# Patient Record
Sex: FEMALE | Race: WHITE | NOT HISPANIC OR LATINO | URBAN - METROPOLITAN AREA
[De-identification: names, ages, dates, MRNs, and addresses within clinical notes are randomized per-mention and may not be internally consistent; named-entity substitution may affect disease eponyms.]

---

## 2019-10-18 ENCOUNTER — OUTPATIENT (OUTPATIENT)
Dept: OUTPATIENT SERVICES | Facility: HOSPITAL | Age: 29
LOS: 1 days | Discharge: HOME | End: 2019-10-18

## 2019-10-23 LAB — SURGICAL PATHOLOGY STUDY: SIGNIFICANT CHANGE UP

## 2024-06-04 PROBLEM — Z00.00 ENCOUNTER FOR PREVENTIVE HEALTH EXAMINATION: Status: ACTIVE | Noted: 2024-06-04

## 2024-08-07 ENCOUNTER — APPOINTMENT (OUTPATIENT)
Dept: GASTROENTEROLOGY | Facility: CLINIC | Age: 34
End: 2024-08-07

## 2024-08-07 PROBLEM — K59.00 CONSTIPATION: Status: ACTIVE | Noted: 2024-08-07

## 2024-08-07 PROBLEM — K62.5 RECTAL BLEEDING: Status: ACTIVE | Noted: 2024-08-07

## 2024-08-07 PROBLEM — D89.40 MAST CELL ACTIVATION SYNDROME: Status: RESOLVED | Noted: 2024-08-07 | Resolved: 2024-08-07

## 2024-08-07 PROBLEM — Q79.62 EHLERS-DANLOS, HYPERMOBILE TYPE: Status: RESOLVED | Noted: 2024-08-07 | Resolved: 2024-08-07

## 2024-08-07 PROBLEM — Z78.9 NEVER SMOKED CIGARETTES: Status: ACTIVE | Noted: 2024-08-07

## 2024-08-07 PROBLEM — R13.10 DYSPHAGIA: Status: ACTIVE | Noted: 2024-08-07

## 2024-08-07 PROBLEM — Z78.9 RARELY CONSUMES ALCOHOL: Status: ACTIVE | Noted: 2024-08-07

## 2024-08-07 PROBLEM — R11.0 NAUSEA: Status: ACTIVE | Noted: 2024-08-07

## 2024-08-07 PROBLEM — Z83.49 FAMILY HISTORY OF THYROID NODULE: Status: ACTIVE | Noted: 2024-08-07

## 2024-08-07 PROBLEM — R93.5 ABNORMAL CT OF THE ABDOMEN: Status: ACTIVE | Noted: 2024-08-07

## 2024-08-07 PROBLEM — G90.A POTS (POSTURAL ORTHOSTATIC TACHYCARDIA SYNDROME): Status: RESOLVED | Noted: 2024-08-07 | Resolved: 2024-08-07

## 2024-08-07 PROBLEM — R12 HEARTBURN: Status: ACTIVE | Noted: 2024-08-07

## 2024-08-07 PROBLEM — Z80.0 FAMILY HISTORY OF MALIGNANT NEOPLASM OF ESOPHAGUS: Status: ACTIVE | Noted: 2024-08-07

## 2024-08-07 PROCEDURE — 36415 COLL VENOUS BLD VENIPUNCTURE: CPT

## 2024-08-07 PROCEDURE — 99205 OFFICE O/P NEW HI 60 MIN: CPT

## 2024-08-07 NOTE — REASON FOR VISIT
[Initial Evaluation] : an initial evaluation [FreeTextEntry1] : Constipation, rectal bleeding, heartburn, dysphagia, nausea, abnormal CT scan

## 2024-08-07 NOTE — HISTORY OF PRESENT ILLNESS
[FreeTextEntry1] : The patient is a 34-year-old woman with hypermobile Josselyn-Danlos syndrome and POTS syndrome who has longstanding GI issues dating back to high school.  At age 16, she suffered from constipation, rectal bleeding, nausea.  She would go over 1 week without a bowel movement.  The symptoms worsened in college.  She started on a gluten-free and dairy free diet which has helped her although she continues to have symptoms.  Currently, the patient gets heartburn for which she takes Tums approximately once a week.  She notes dysphagia to both solids and liquids, noting that these are slow to go down.  She denies regurgitation.  She gets nausea and has occasional vomiting.  She also has diffuse abdominal pain.  She has a longstanding history of constipation.  She has used multiple remedies in the past.  MiraLAX makes her vomit and does not work.  Linzess gave her pain.  The patient had also been on Motegrity.  She now uses both saline and Fleet enemas approximately 4 times a month and Dulcolax suppositories 1-2 times a month.  With laxatives, she has a bowel movement every 4 to 5 days.  She sees bright red blood with every bowel movement in the toilet water.  She has seen black stool in the past but not recently.  The patient's weight is stable.  She remains generally gluten-free although she cheats 1-2 times a month.  She is also dairy free.  She avoids spicy foods, garlic, onions, tomatoes.  We spent some time reviewing the evaluations that have been done in the past.  I reviewed a large medical record.  The patient underwent EGD and colonoscopy on September 1, 2020.  Apparently, this was done while the patient was still on a gluten-free diet.  EGD was significant for an irregular Z-line.  Biopsies from the small bowel, antrum, body, and GE junction showed scattered mast cells but no proliferations of mast cells on  staining.  A colonoscopy was significant only for internal hemorrhoids.  Biopsies taken from the terminal ileum and random biopsies in the colon also showed scattered mast cells but no proliferations.  A gastric emptying scan performed on October 16, 2020 was abnormal consistent with gastroparesis.  A CT scan performed on February 4, 2022 showed moderate to significant focal distention of the right and transverse colon with mild distention and fecalization of the terminal ileum likely due to fecal loading of the right colon.  Additionally, possible wall thickening versus lack of distention was noted in the distal descending, sigmoid, and rectum.  This was thought to possibly represent underlying colitis.  The patient has been seeing a colorectal surgeon, Dr. Espinosa, who sent her for anorectal manometry performed on July 8, 2024.  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  Biofeedback was recommended.  The doctor also has ordered MR defecography as well as a transenteric bowel assessment nuclear medicine test.

## 2024-08-07 NOTE — ASSESSMENT
[FreeTextEntry1] : Patient with hypermobile Josselyn-Danlos syndrome and POTS syndrome who has longstanding GI issues including constipation, heartburn, dysphagia, nausea, rectal bleeding.  She is on a gluten-free and dairy free diet.  A CT scan in February 2022 showed possible wall thickening of the left colon with underlying colitis unable to be excluded.  Recent anorectal manometry was consistent with type III dyssynergic defecation.  We discussed the need to consider celiac disease and the difficulties of making this diagnosis while the patient is on a gluten-free diet.  Bloodwork was sent for CBC, chem-pack, TSH, celiac markers, celiac genetic testing, amylase, lipase, sed rate, C-reactive protein, IBD serologies.  An EGD and colonoscopy have been scheduled. The risks, benefits, alternatives, and limitations of the procedures, including the possibility of missed lesions, were explained.  Based on the results of the blood work, we will determine whether or not the patient will need to go back on a gluten containing diet prior to the endoscopy to fully evaluate for celiac disease.  The patient was advised to proceed with the workup prescribed by her colorectal surgeon including MR defecography and the nuclear medicine transit enteric bowel assessment.  After the above workup, we will need to consider biofeedback.

## 2024-08-07 NOTE — CONSULT LETTER
[FreeTextEntry1] : Dear Dr. Brittany Baeza,   I had the pleasure of seeing your patient SABA SCHMIDT in the office today.  My office note is attached. PLEASE READ THE "ASSESSMENT" SECTION OF THE NOTE TO SEE MY IMPRESSION AND PLAN.   Thank you very much for allowing me to participate in the care of your patient.   Sincerely,   Kendrick Thayer M.D., FACG, FACP Director, Celiac Program at [facility]  of Medicine, [facility] [employer] [facility] at [facility] Adjunct  of Medicine, [facility] of Medicine Practice Director, [facility] - Gastroenterology at [address] - [address] [place] [address] Tel: [phone] Email: [email or web address]     The attached note has been created using a voice recognition system (Dragon).  There may be some misspellings and typos.  Please call my office if you have any issues or questions.

## 2024-08-07 NOTE — CONSULT LETTER
[FreeTextEntry1] : Dear Dr. Brittany Baeza,   I had the pleasure of seeing your patient SABA SCHMIDT in the office today.  My office note is attached. PLEASE READ THE "ASSESSMENT" SECTION OF THE NOTE TO SEE MY IMPRESSION AND PLAN.   Thank you very much for allowing me to participate in the care of your patient.   Sincerely,   Kendrick Thayer M.D., FACG, FACP Director, Celiac Program at [facility]  of Medicine, [facility] [employer] [facility] at [facility] Adjunct  of Medicine, [facility] of Medicine Practice Director, [facility] - Gastroenterology at [address] - [address] [place] [address] Tel: [phone] Email: [email or web address]     The attached note has been created using a voice recognition system (Dragon).  There may be some misspellings and typos.  Please call my office if you have any issues or questions.

## 2024-08-08 ENCOUNTER — TRANSCRIPTION ENCOUNTER (OUTPATIENT)
Age: 34
End: 2024-08-08

## 2024-08-15 DIAGNOSIS — R74.8 ABNORMAL LEVELS OF OTHER SERUM ENZYMES: ICD-10-CM

## 2024-08-20 ENCOUNTER — TRANSCRIPTION ENCOUNTER (OUTPATIENT)
Age: 34
End: 2024-08-20

## 2024-09-20 ENCOUNTER — APPOINTMENT (OUTPATIENT)
Dept: CT IMAGING | Facility: CLINIC | Age: 34
End: 2024-09-20
Payer: COMMERCIAL

## 2024-09-20 ENCOUNTER — OUTPATIENT (OUTPATIENT)
Dept: OUTPATIENT SERVICES | Facility: HOSPITAL | Age: 34
LOS: 1 days | End: 2024-09-20
Payer: COMMERCIAL

## 2024-09-20 DIAGNOSIS — R74.8 ABNORMAL LEVELS OF OTHER SERUM ENZYMES: ICD-10-CM

## 2024-09-20 PROCEDURE — 70470 CT HEAD/BRAIN W/O & W/DYE: CPT | Mod: 26

## 2024-09-20 PROCEDURE — 70470 CT HEAD/BRAIN W/O & W/DYE: CPT

## 2024-12-16 ENCOUNTER — APPOINTMENT (OUTPATIENT)
Dept: GASTROENTEROLOGY | Facility: AMBULATORY MEDICAL SERVICES | Age: 34
End: 2024-12-16
Payer: COMMERCIAL

## 2024-12-16 PROCEDURE — 45380 COLONOSCOPY AND BIOPSY: CPT

## 2024-12-16 PROCEDURE — 43239 EGD BIOPSY SINGLE/MULTIPLE: CPT | Mod: 59

## 2024-12-19 LAB
ENDOMYSIUM IGA SER QL: NEGATIVE
ENDOMYSIUM IGA TITR SER: NORMAL
GLIADIN IGA SER QL: <0.2 U/ML
GLIADIN IGG SER QL: <0.4 U/ML
GLIADIN PEPTIDE IGA SER-ACNC: NEGATIVE
GLIADIN PEPTIDE IGG SER-ACNC: NEGATIVE
IGA SER QL IEP: 109 MG/DL
TTG IGA SER IA-ACNC: <0.5 U/ML
TTG IGA SER-ACNC: NEGATIVE

## 2025-01-02 ENCOUNTER — NON-APPOINTMENT (OUTPATIENT)
Age: 35
End: 2025-01-02

## 2025-04-24 ENCOUNTER — APPOINTMENT (OUTPATIENT)
Dept: GASTROENTEROLOGY | Facility: CLINIC | Age: 35
End: 2025-04-24
Payer: COMMERCIAL

## 2025-04-24 DIAGNOSIS — K21.9 GASTRO-ESOPHAGEAL REFLUX DISEASE W/OUT ESOPHAGITIS: ICD-10-CM

## 2025-04-24 DIAGNOSIS — K62.5 HEMORRHAGE OF ANUS AND RECTUM: ICD-10-CM

## 2025-04-24 DIAGNOSIS — K59.00 CONSTIPATION, UNSPECIFIED: ICD-10-CM

## 2025-04-24 DIAGNOSIS — D50.9 IRON DEFICIENCY ANEMIA, UNSPECIFIED: ICD-10-CM

## 2025-04-24 DIAGNOSIS — R74.8 ABNORMAL LEVELS OF OTHER SERUM ENZYMES: ICD-10-CM

## 2025-04-24 DIAGNOSIS — K22.70 BARRETT'S ESOPHAGUS W/OUT DYSPLASIA: ICD-10-CM

## 2025-04-24 PROCEDURE — 99215 OFFICE O/P EST HI 40 MIN: CPT | Mod: 95

## 2025-04-24 RX ORDER — PANTOPRAZOLE 40 MG/1
40 TABLET, DELAYED RELEASE ORAL
Qty: 90 | Refills: 1 | Status: ACTIVE | COMMUNITY
Start: 2025-04-24 | End: 1900-01-01

## 2025-07-30 ENCOUNTER — APPOINTMENT (OUTPATIENT)
Dept: GASTROENTEROLOGY | Facility: CLINIC | Age: 35
End: 2025-07-30
Payer: COMMERCIAL

## 2025-07-30 DIAGNOSIS — K21.9 GASTRO-ESOPHAGEAL REFLUX DISEASE W/OUT ESOPHAGITIS: ICD-10-CM

## 2025-07-30 PROCEDURE — 99213 OFFICE O/P EST LOW 20 MIN: CPT | Mod: 95
